# Patient Record
Sex: MALE | Race: BLACK OR AFRICAN AMERICAN | NOT HISPANIC OR LATINO | Employment: UNEMPLOYED | URBAN - METROPOLITAN AREA
[De-identification: names, ages, dates, MRNs, and addresses within clinical notes are randomized per-mention and may not be internally consistent; named-entity substitution may affect disease eponyms.]

---

## 2018-08-19 ENCOUNTER — HOSPITAL ENCOUNTER (EMERGENCY)
Facility: HOSPITAL | Age: 33
Discharge: HOME | End: 2018-08-19
Attending: EMERGENCY MEDICINE

## 2018-08-19 VITALS
HEART RATE: 54 BPM | DIASTOLIC BLOOD PRESSURE: 93 MMHG | SYSTOLIC BLOOD PRESSURE: 159 MMHG | TEMPERATURE: 98 F | HEIGHT: 71 IN | BODY MASS INDEX: 28.7 KG/M2 | OXYGEN SATURATION: 100 % | WEIGHT: 205 LBS | RESPIRATION RATE: 18 BRPM

## 2018-08-19 DIAGNOSIS — R10.32 LLQ PAIN: Primary | ICD-10-CM

## 2018-08-19 LAB
BILIRUB UR QL STRIP: NEGATIVE
CLARITY UR: CLEAR
COLOR UR: YELLOW
GLUCOSE UR QL STRIP: NEGATIVE
HGB UR QL STRIP: NEGATIVE
KETONES UR QL STRIP: NEGATIVE
LEUKOCYTE ESTERASE UR QL STRIP: NEGATIVE
NITRITE UR QL STRIP: NEGATIVE
PH UR STRIP: 7 [PH] (ref 5–8)
PROT UR QL STRIP: NEGATIVE
SP GR UR STRIP: 1.02 (ref 1–1.03)
URN SPEC COLLECT METH UR: ABNORMAL
UROBILINOGEN UR STRIP-ACNC: ABNORMAL EU/DL

## 2018-08-19 PROCEDURE — 25000003 PHARM REV CODE 250: Performed by: PHYSICIAN ASSISTANT

## 2018-08-19 PROCEDURE — 81003 URINALYSIS AUTO W/O SCOPE: CPT

## 2018-08-19 PROCEDURE — 63600175 PHARM REV CODE 636 W HCPCS: Performed by: PHYSICIAN ASSISTANT

## 2018-08-19 PROCEDURE — 96372 THER/PROPH/DIAG INJ SC/IM: CPT

## 2018-08-19 PROCEDURE — 99284 EMERGENCY DEPT VISIT MOD MDM: CPT | Mod: 25

## 2018-08-19 RX ORDER — MAG HYDROX/ALUMINUM HYD/SIMETH 200-200-20
15 SUSPENSION, ORAL (FINAL DOSE FORM) ORAL
Status: COMPLETED | OUTPATIENT
Start: 2018-08-19 | End: 2018-08-19

## 2018-08-19 RX ORDER — CIPROFLOXACIN 500 MG/1
500 TABLET ORAL 2 TIMES DAILY
Qty: 14 TABLET | Refills: 0 | Status: SHIPPED | OUTPATIENT
Start: 2018-08-19 | End: 2018-08-26

## 2018-08-19 RX ORDER — KETOROLAC TROMETHAMINE 30 MG/ML
15 INJECTION, SOLUTION INTRAMUSCULAR; INTRAVENOUS
Status: COMPLETED | OUTPATIENT
Start: 2018-08-19 | End: 2018-08-19

## 2018-08-19 RX ORDER — METRONIDAZOLE 500 MG/1
500 TABLET ORAL EVERY 12 HOURS
Qty: 14 TABLET | Refills: 0 | Status: SHIPPED | OUTPATIENT
Start: 2018-08-19 | End: 2018-08-26

## 2018-08-19 RX ORDER — IBUPROFEN 600 MG/1
600 TABLET ORAL EVERY 6 HOURS PRN
Qty: 20 TABLET | Refills: 0 | Status: SHIPPED | OUTPATIENT
Start: 2018-08-19

## 2018-08-19 RX ORDER — DICYCLOMINE HYDROCHLORIDE 10 MG/5ML
10 SOLUTION ORAL
Status: COMPLETED | OUTPATIENT
Start: 2018-08-19 | End: 2018-08-19

## 2018-08-19 RX ORDER — DICYCLOMINE HYDROCHLORIDE 20 MG/1
20 TABLET ORAL 2 TIMES DAILY
Qty: 10 TABLET | Refills: 0 | Status: SHIPPED | OUTPATIENT
Start: 2018-08-19 | End: 2018-08-24

## 2018-08-19 RX ADMIN — DICYCLOMINE HYDROCHLORIDE 10 MG: 10 SOLUTION ORAL at 11:08

## 2018-08-19 RX ADMIN — KETOROLAC TROMETHAMINE 15 MG: 30 INJECTION, SOLUTION INTRAMUSCULAR at 11:08

## 2018-08-19 RX ADMIN — ALUMINUM HYDROXIDE, MAGNESIUM HYDROXIDE, AND SIMETHICONE 15 ML: 200; 200; 20 SUSPENSION ORAL at 11:08

## 2018-08-19 NOTE — ED TRIAGE NOTES
Patient reports lower abdominal pain x 2 days and worsened today.. Reports vomiting x 1 episode.  Last BM today. Denies fever or urinary problems.Denies diarrhea.

## 2018-08-19 NOTE — ED PROVIDER NOTES
Encounter Date: 8/19/2018  SORT: This is a 32 y.o. malewith no PMHx who presents for emergent consideration of LLQ abdominal pain with 1 episode of emesis this morning. Attempted Pepto Bismol.  Initial exam: patient holding left lower abdomen, +BS. Patient will be moved to a room when one is available.   LEILANI Rangel PA-C     SCRIBE #1 NOTE: ISusanne am scribing for, and in the presence of,  Glenn Landrum PA-C. I have scribed the following portions of the note - Other sections scribed: HPI, ROS.       History     Chief Complaint   Patient presents with    Abdominal Pain     pt c/o left sided abd pain for the last 3 days, denies diarrhea or hematuria.      CC: Abdominal Pain    HPI: 31 y/o male with no pertinent PMHx presents to the ED c/o acute onset lower abdominal pain that began x2 days ago. Pt rates the pain as moderate (4/10) and constant. Pt states the pain worsened today with associated diaphoresis. No exacerbating or alleviating factors. Pt states that he vomited once after taking Pepto Bismol. Pt had a normal BM this morning. Pt denies constipation, diarrhea, urinary problems, fever, chills, or testicular pain. Denies abdominal surgeries in the past. No other symptoms reported.      The history is provided by the patient. No  was used.     Review of patient's allergies indicates:  Allergies not on file  History reviewed. No pertinent past medical history.  History reviewed. No pertinent surgical history.  History reviewed. No pertinent family history.  Social History     Tobacco Use    Smoking status: Never Smoker   Substance Use Topics    Alcohol use: No     Frequency: Never    Drug use: Not on file     Review of Systems   Constitutional: Positive for diaphoresis (associated with abdominal pain). Negative for chills and fever.   HENT: Negative for congestion, ear pain, rhinorrhea and sore throat.    Eyes: Negative for pain and visual disturbance.   Respiratory:  Negative for cough and shortness of breath.    Cardiovascular: Negative for chest pain.   Gastrointestinal: Positive for abdominal pain (lower) and vomiting (x1 ep after drinking Pepto). Negative for diarrhea and nausea.   Genitourinary: Negative for dysuria.   Musculoskeletal: Negative for back pain and neck pain.   Skin: Negative for rash.   Neurological: Negative for headaches.   All other systems reviewed and are negative.      Physical Exam     Initial Vitals [08/19/18 1048]   BP Pulse Resp Temp SpO2   (!) 151/88 (!) 52 16 98.8 °F (37.1 °C) 100 %      MAP       --         Physical Exam    Vitals reviewed.  Constitutional: He appears well-developed and well-nourished. He is not diaphoretic. No distress.   HENT:   Head: Normocephalic and atraumatic.   Right Ear: External ear normal.   Left Ear: External ear normal.   Nose: Nose normal.   Eyes: Conjunctivae are normal. No scleral icterus.   Neck: Normal range of motion. Neck supple.   Cardiovascular: Normal rate, regular rhythm, normal heart sounds and intact distal pulses.   Pulmonary/Chest: Breath sounds normal. No respiratory distress. He has no wheezes. He has no rhonchi. He has no rales. He exhibits no tenderness.   Abdominal: Soft. Bowel sounds are normal. He exhibits no distension and no mass. There is tenderness (Minimal) in the left lower quadrant. There is no rigidity, no rebound, no guarding, no CVA tenderness, no tenderness at McBurney's point and negative Marie's sign.   Musculoskeletal: Normal range of motion.   Neurological: He is alert and oriented to person, place, and time.   Skin: Skin is warm and dry. No rash noted. No erythema.         ED Course   Procedures  Labs Reviewed   URINALYSIS, REFLEX TO URINE CULTURE - Abnormal; Notable for the following components:       Result Value    Urobilinogen, UA 4.0-6.0 (*)     All other components within normal limits    Narrative:     Preferred Collection Type->Urine, Clean Catch          Imaging Results     None          Medical Decision Making:   ED Management:  32-year-old healthy male left lower quadrant pain x2 days and 1 episode of vomiting. Normal bowel movements, no dysuria, no fever, or hematuria.  Abdomen soft with minimal left lower quadrant tenderness.  No peritoneal signs. No CVA tenderness. Urolithiasis and diverticulitis were considered. UA without evidence of infection.  No hematuria.  Large urolithiasis less likely.  CT scan attempted, but patient refused.  Given patient's presentation and exam I do not suspect surgical abdomen.  It will cover patient with Cipro and Flagyl for possible diverticulitis.  Patient given strict return precautions regarding worsening symptoms or new concerning symptoms. He verbalized understanding and agreed with plan.            Scribe Attestation:   Scribe #1: I performed the above scribed service and the documentation accurately describes the services I performed. I attest to the accuracy of the note.    Attending Attestation:           Physician Attestation for Scribe:  Physician Attestation Statement for Scribe #1: I, Glenn Landrum PA-C, reviewed documentation, as scribed by Susanne Cornejo in my presence, and it is both accurate and complete.                    Clinical Impression:   The encounter diagnosis was LLQ pain.                             Glenn Landrum PA-C  08/19/18 9476

## 2019-06-24 ENCOUNTER — HOSPITAL ENCOUNTER (EMERGENCY)
Facility: HOSPITAL | Age: 34
Discharge: PSYCHIATRIC OR SUBSTANCE USE FACILITY | End: 2019-06-24
Attending: EMERGENCY MEDICINE
Payer: MEDICAID

## 2019-06-24 VITALS
TEMPERATURE: 98 F | HEIGHT: 71 IN | BODY MASS INDEX: 28.7 KG/M2 | DIASTOLIC BLOOD PRESSURE: 91 MMHG | HEART RATE: 62 BPM | WEIGHT: 205 LBS | RESPIRATION RATE: 18 BRPM | OXYGEN SATURATION: 98 % | SYSTOLIC BLOOD PRESSURE: 132 MMHG

## 2019-06-24 DIAGNOSIS — R45.851 SUICIDAL IDEATION: Primary | ICD-10-CM

## 2019-06-24 DIAGNOSIS — F12.10 MARIJUANA ABUSE: ICD-10-CM

## 2019-06-24 DIAGNOSIS — F32.A DEPRESSION, UNSPECIFIED DEPRESSION TYPE: ICD-10-CM

## 2019-06-24 LAB
ALBUMIN SERPL BCP-MCNC: 3.8 G/DL (ref 3.5–5.2)
ALP SERPL-CCNC: 40 U/L (ref 55–135)
ALT SERPL W/O P-5'-P-CCNC: 17 U/L (ref 10–44)
AMPHET+METHAMPHET UR QL: NEGATIVE
ANION GAP SERPL CALC-SCNC: 8 MMOL/L (ref 8–16)
APAP SERPL-MCNC: <3 UG/ML (ref 10–20)
AST SERPL-CCNC: 17 U/L (ref 10–40)
BARBITURATES UR QL SCN>200 NG/ML: NEGATIVE
BASOPHILS # BLD AUTO: 0.02 K/UL (ref 0–0.2)
BASOPHILS NFR BLD: 0.2 % (ref 0–1.9)
BENZODIAZ UR QL SCN>200 NG/ML: ABNORMAL
BILIRUB SERPL-MCNC: 0.7 MG/DL (ref 0.1–1)
BILIRUB UR QL STRIP: NEGATIVE
BUN SERPL-MCNC: 11 MG/DL (ref 6–20)
BZE UR QL SCN: NEGATIVE
CALCIUM SERPL-MCNC: 8.5 MG/DL (ref 8.7–10.5)
CANNABINOIDS UR QL SCN: ABNORMAL
CHLORIDE SERPL-SCNC: 109 MMOL/L (ref 95–110)
CLARITY UR: CLEAR
CO2 SERPL-SCNC: 25 MMOL/L (ref 23–29)
COLOR UR: YELLOW
CREAT SERPL-MCNC: 1.1 MG/DL (ref 0.5–1.4)
CREAT UR-MCNC: >450 MG/DL (ref 23–375)
DIFFERENTIAL METHOD: ABNORMAL
EOSINOPHIL # BLD AUTO: 0.2 K/UL (ref 0–0.5)
EOSINOPHIL NFR BLD: 1.7 % (ref 0–8)
ERYTHROCYTE [DISTWIDTH] IN BLOOD BY AUTOMATED COUNT: 13.4 % (ref 11.5–14.5)
EST. GFR  (AFRICAN AMERICAN): >60 ML/MIN/1.73 M^2
EST. GFR  (NON AFRICAN AMERICAN): >60 ML/MIN/1.73 M^2
ETHANOL SERPL-MCNC: <10 MG/DL
GLUCOSE SERPL-MCNC: 91 MG/DL (ref 70–110)
GLUCOSE UR QL STRIP: NEGATIVE
HCT VFR BLD AUTO: 38.6 % (ref 40–54)
HGB BLD-MCNC: 12.8 G/DL (ref 14–18)
HGB UR QL STRIP: NEGATIVE
KETONES UR QL STRIP: NEGATIVE
LEUKOCYTE ESTERASE UR QL STRIP: NEGATIVE
LYMPHOCYTES # BLD AUTO: 2.6 K/UL (ref 1–4.8)
LYMPHOCYTES NFR BLD: 25.6 % (ref 18–48)
MCH RBC QN AUTO: 28.8 PG (ref 27–31)
MCHC RBC AUTO-ENTMCNC: 33.2 G/DL (ref 32–36)
MCV RBC AUTO: 87 FL (ref 82–98)
METHADONE UR QL SCN>300 NG/ML: NEGATIVE
MONOCYTES # BLD AUTO: 0.5 K/UL (ref 0.3–1)
MONOCYTES NFR BLD: 4.7 % (ref 4–15)
NEUTROPHILS # BLD AUTO: 6.8 K/UL (ref 1.8–7.7)
NEUTROPHILS NFR BLD: 68 % (ref 38–73)
NITRITE UR QL STRIP: NEGATIVE
OPIATES UR QL SCN: NEGATIVE
PCP UR QL SCN>25 NG/ML: NEGATIVE
PH UR STRIP: 5 [PH] (ref 5–8)
PLATELET # BLD AUTO: 169 K/UL (ref 150–350)
PMV BLD AUTO: 10.4 FL (ref 9.2–12.9)
POTASSIUM SERPL-SCNC: 3.7 MMOL/L (ref 3.5–5.1)
PROT SERPL-MCNC: 5.7 G/DL (ref 6–8.4)
PROT UR QL STRIP: NEGATIVE
RBC # BLD AUTO: 4.44 M/UL (ref 4.6–6.2)
SODIUM SERPL-SCNC: 142 MMOL/L (ref 136–145)
SP GR UR STRIP: 1.03 (ref 1–1.03)
TOXICOLOGY INFORMATION: ABNORMAL
TSH SERPL DL<=0.005 MIU/L-ACNC: 0.74 UIU/ML (ref 0.4–4)
URN SPEC COLLECT METH UR: NORMAL
UROBILINOGEN UR STRIP-ACNC: NEGATIVE EU/DL
WBC # BLD AUTO: 10.05 K/UL (ref 3.9–12.7)

## 2019-06-24 PROCEDURE — 85025 COMPLETE CBC W/AUTO DIFF WBC: CPT

## 2019-06-24 PROCEDURE — 96372 THER/PROPH/DIAG INJ SC/IM: CPT

## 2019-06-24 PROCEDURE — 80320 DRUG SCREEN QUANTALCOHOLS: CPT

## 2019-06-24 PROCEDURE — 84443 ASSAY THYROID STIM HORMONE: CPT

## 2019-06-24 PROCEDURE — 80307 DRUG TEST PRSMV CHEM ANLYZR: CPT

## 2019-06-24 PROCEDURE — 99285 EMERGENCY DEPT VISIT HI MDM: CPT | Mod: 25

## 2019-06-24 PROCEDURE — 80329 ANALGESICS NON-OPIOID 1 OR 2: CPT

## 2019-06-24 PROCEDURE — 63600175 PHARM REV CODE 636 W HCPCS: Performed by: EMERGENCY MEDICINE

## 2019-06-24 PROCEDURE — 80053 COMPREHEN METABOLIC PANEL: CPT

## 2019-06-24 PROCEDURE — 81003 URINALYSIS AUTO W/O SCOPE: CPT | Mod: 59

## 2019-06-24 RX ORDER — HALOPERIDOL 5 MG/ML
5 INJECTION INTRAMUSCULAR
Status: COMPLETED | OUTPATIENT
Start: 2019-06-24 | End: 2019-06-24

## 2019-06-24 RX ORDER — MIDAZOLAM HYDROCHLORIDE 1 MG/ML
4 INJECTION INTRAMUSCULAR; INTRAVENOUS
Status: COMPLETED | OUTPATIENT
Start: 2019-06-24 | End: 2019-06-24

## 2019-06-24 RX ADMIN — MIDAZOLAM HYDROCHLORIDE 4 MG: 1 INJECTION, SOLUTION INTRAMUSCULAR; INTRAVENOUS at 05:06

## 2019-06-24 RX ADMIN — HALOPERIDOL LACTATE 5 MG: 5 INJECTION INTRAMUSCULAR at 05:06

## 2019-06-24 NOTE — ED NOTES
"Pt appears anxious. He states "I want to go home, you cant legally hold me here". Pt educated on PEC process. Dr. Cheng made aware of pt agitation.  "

## 2019-06-24 NOTE — ED TRIAGE NOTES
"Pt arrived to ED via personal transport with c/o SI. Pt reports he has been having suicidal thoughts since his moms death in 2017. He denies any plan or intent to act. He states he "wants to not wake up". He denies HI or hallucinations. He is AAO x 4 at this time.   "

## 2019-06-25 NOTE — ED NOTES
Admit packet faxed to [facility], [facility], [facility], [facility], [facility], [facility], [facility], [facility], [facility].  Waiting for response.

## 2019-06-25 NOTE — ED NOTES
Patient accepted by [facility] at [facility] ([address] [place] [address]) for the service of Dr. Najera. Report to be called to   [phone].

## 2019-06-25 NOTE — ED NOTES
PATIENT ESCORTED BY [facility] (UNIT #6),TRANSFERRED TO [facility])

## 2019-06-25 NOTE — ED NOTES
Admit packet faxed to [facility], [facility], [facility], and [facility].

## 2024-02-08 NOTE — ED PROVIDER NOTES
"Encounter Date: 2019    SCRIBE #1 NOTE: I, Cira Garcia , am scribing for, and in the presence of,  Alpesh MARQUEZ). I have scribed the following portions of the note - Other sections scribed: HPI, ROS, PE, and ED Management .       History     Chief Complaint   Patient presents with    Suicidal ideation     "I'm having thoughts of not being here anymore."  Denies plan or HI.     CC: SI    HPI:  This is a 33 y.o. male who presents to the Emergency Department with a cc of SI x2 years that has progressively gotten worse over time. He was not specific in describing the SI besides the fact that he wants to do something to himself that prevents him from waking up. He states that the thoughts started after his mother  (2 years ago). It was reported that he has not been feeling himself and has been overwhelmed. He denies having any auditory hallucinations or visual hallucinations. He received a call while at work today about his current living situation that made the patient more overwhelmed/upset. There were no alleviating factors reported. He has not seen anyone for his condition. The patient has no medical history, allergies, or medications to report.          The history is provided by the patient.     Review of patient's allergies indicates:  No Known Allergies  History reviewed. No pertinent past medical history.  History reviewed. No pertinent surgical history.  History reviewed. No pertinent family history.  Social History     Tobacco Use    Smoking status: Never Smoker    Smokeless tobacco: Never Used   Substance Use Topics    Alcohol use: No     Frequency: Never    Drug use: Not Currently     Review of Systems   Constitutional: Negative for fever.   HENT: Negative for sore throat.    Eyes: Negative for visual disturbance.   Respiratory: Negative for shortness of breath.    Cardiovascular: Negative for chest pain.   Gastrointestinal: Negative for abdominal pain.   Genitourinary: Negative for " dysuria.   Musculoskeletal: Negative for back pain.   Skin: Negative for rash.   Neurological: Negative for headaches.   Psychiatric/Behavioral: Positive for suicidal ideas. Negative for confusion and hallucinations.       Physical Exam     Initial Vitals [06/24/19 1548]   BP Pulse Resp Temp SpO2   (!) 150/92 65 18 98 °F (36.7 °C) 100 %      MAP       --         Physical Exam    Nursing note and vitals reviewed.  Constitutional: He appears well-developed and well-nourished.   HENT:   Head: Normocephalic and atraumatic.   Eyes: EOM are normal. Pupils are equal, round, and reactive to light.   Neck: Normal range of motion. Neck supple. No thyromegaly present. No JVD present.   Cardiovascular: Normal rate and regular rhythm. Exam reveals no gallop and no friction rub.    No murmur heard.  Pulmonary/Chest: Breath sounds normal. No respiratory distress.   Abdominal: Soft. Bowel sounds are normal. There is no tenderness.   Musculoskeletal: Normal range of motion. He exhibits no edema or tenderness.   Neurological: He is alert and oriented to person, place, and time. He has normal strength. GCS score is 15. GCS eye subscore is 4. GCS verbal subscore is 5. GCS motor subscore is 6.   Skin: Skin is warm and dry. Capillary refill takes less than 2 seconds.   Psychiatric: He is not actively hallucinating. He exhibits a depressed mood. He expresses no homicidal plans.   Patient looks overwhelmed         ED Course   Procedures  Labs Reviewed   CBC W/ AUTO DIFFERENTIAL - Abnormal; Notable for the following components:       Result Value    RBC 4.44 (*)     Hemoglobin 12.8 (*)     Hematocrit 38.6 (*)     All other components within normal limits   COMPREHENSIVE METABOLIC PANEL - Abnormal; Notable for the following components:    Calcium 8.5 (*)     Total Protein 5.7 (*)     Alkaline Phosphatase 40 (*)     All other components within normal limits   DRUG SCREEN PANEL, URINE EMERGENCY - Abnormal; Notable for the following components:     Creatinine, Random Ur >450.0 (*)     All other components within normal limits    Narrative:     Preferred Collection Type->Urine, Clean Catch   ACETAMINOPHEN LEVEL - Abnormal; Notable for the following components:    Acetaminophen (Tylenol), Serum <3.0 (*)     All other components within normal limits   TSH   URINALYSIS, REFLEX TO URINE CULTURE    Narrative:     Preferred Collection Type->Urine, Clean Catch   ALCOHOL,MEDICAL (ETHANOL)          Imaging Results    None          Medical Decision Making:   Initial Assessment:   33-year-old male presenting today secondary to suicidal ideation.  Patient stating his symptoms her getting worse and worse had her coming to a head here after 2 years.  Patient seems withdrawn and tearful.  Patient is having progressive thoughts of not being anymore.  Patient has thought the different ways it would be good that he would not be here anymore (death) but will not tell me any specifics.  Due to his physical exam and also the words he saying about a higher concerned that he will actually commit suicide.  Patient was placed on a PEC.  Basic labs ordered on the patient.  Patient states that he feels all own in the world due to the fact that his mother is not there anymore.  Patient does not have anyone that he feels like he can communicate with early in on.    Patient is getting agitated and frustrated due the fact he was being held against his will.  Post communicated to him by multiple people that we have a high concern is suicidal.  LEVI Zimmer Versed ordered.  Patient was able to calm down afterwards.  Pending labs.    Scribe attestation: I, Alpesh Cheng, personally performed the services described in this documentation. All medical record entries made by the scribe were at my direction and in my presence.  I have reviewed the chart and agree that the record reflects my personal performance and is accurate and complete.    Labs showed benzos and marijuana in his system.   Benzodiazepines are likely secondary to the Versed that I gave him.. Patient is now medically cleared for inpatient psych facility.   Clinical Tests:   Lab Tests: Ordered and Reviewed  ED Management:  3:53 PM: Initial assessment of patient            Scribe Attestation:   Scribe #1: I performed the above scribed service and the documentation accurately describes the services I performed. I attest to the accuracy of the note.               Clinical Impression:     1. Suicidal ideation    2. Depression, unspecified depression type    3. Marijuana abuse                              Alpesh Cheng MD  06/24/19 1668     Yes